# Patient Record
Sex: FEMALE | HISPANIC OR LATINO | Employment: FULL TIME | URBAN - METROPOLITAN AREA
[De-identification: names, ages, dates, MRNs, and addresses within clinical notes are randomized per-mention and may not be internally consistent; named-entity substitution may affect disease eponyms.]

---

## 2021-06-08 ENCOUNTER — HOSPITAL ENCOUNTER (OUTPATIENT)
Dept: LAB | Facility: MEDICAL CENTER | Age: 21
End: 2021-06-08
Attending: INTERNAL MEDICINE
Payer: COMMERCIAL

## 2021-06-08 LAB
FERRITIN SERPL-MCNC: 230 NG/ML (ref 10–291)
HBV CORE AB SERPL QL IA: NONREACTIVE
HBV SURFACE AB SERPL IA-ACNC: ABNORMAL MIU/ML (ref 0–10)
HBV SURFACE AG SER QL: NORMAL
HCV AB SER QL: NORMAL
IRON SATN MFR SERPL: 29 % (ref 15–55)
IRON SERPL-MCNC: 90 UG/DL (ref 40–170)
TIBC SERPL-MCNC: 314 UG/DL (ref 250–450)
UIBC SERPL-MCNC: 224 UG/DL (ref 110–370)

## 2021-06-08 PROCEDURE — 82390 ASSAY OF CERULOPLASMIN: CPT

## 2021-06-08 PROCEDURE — 82728 ASSAY OF FERRITIN: CPT

## 2021-06-08 PROCEDURE — 83540 ASSAY OF IRON: CPT

## 2021-06-08 PROCEDURE — 82104 ALPHA-1-ANTITRYPSIN PHENO: CPT

## 2021-06-08 PROCEDURE — 86704 HEP B CORE ANTIBODY TOTAL: CPT

## 2021-06-08 PROCEDURE — 36415 COLL VENOUS BLD VENIPUNCTURE: CPT

## 2021-06-08 PROCEDURE — 86706 HEP B SURFACE ANTIBODY: CPT

## 2021-06-08 PROCEDURE — 87340 HEPATITIS B SURFACE AG IA: CPT

## 2021-06-08 PROCEDURE — 83550 IRON BINDING TEST: CPT

## 2021-06-08 PROCEDURE — 86708 HEPATITIS A ANTIBODY: CPT

## 2021-06-08 PROCEDURE — 82103 ALPHA-1-ANTITRYPSIN TOTAL: CPT

## 2021-06-08 PROCEDURE — 86803 HEPATITIS C AB TEST: CPT

## 2021-06-10 LAB
CERULOPLASMIN SERPL-MCNC: 48 MG/DL (ref 17–54)
HAV AB SER QL IA: POSITIVE

## 2021-06-12 LAB
A1AT PHENOTYP SERPL-IMP: NORMAL
A1AT SERPL-MCNC: 193 MG/DL (ref 90–200)

## 2024-04-05 ENCOUNTER — HOSPITAL ENCOUNTER (EMERGENCY)
Facility: MEDICAL CENTER | Age: 24
End: 2024-04-05
Attending: OBSTETRICS & GYNECOLOGY | Admitting: OBSTETRICS & GYNECOLOGY
Payer: COMMERCIAL

## 2024-04-05 VITALS
TEMPERATURE: 97.6 F | RESPIRATION RATE: 18 BRPM | HEART RATE: 82 BPM | HEIGHT: 60 IN | SYSTOLIC BLOOD PRESSURE: 129 MMHG | BODY MASS INDEX: 34.16 KG/M2 | OXYGEN SATURATION: 99 % | WEIGHT: 174 LBS | DIASTOLIC BLOOD PRESSURE: 85 MMHG

## 2024-04-05 LAB
APPEARANCE UR: CLEAR
COLOR UR AUTO: YELLOW
CRYSTALS AMN MICRO: NORMAL
GLUCOSE UR QL STRIP.AUTO: NEGATIVE MG/DL
KETONES UR QL STRIP.AUTO: NEGATIVE MG/DL
LEUKOCYTE ESTERASE UR QL STRIP.AUTO: ABNORMAL
NITRITE UR QL STRIP.AUTO: NEGATIVE
PH UR STRIP.AUTO: 7 [PH] (ref 5–8)
PROT UR QL STRIP: ABNORMAL MG/DL
RBC UR QL AUTO: ABNORMAL
SP GR UR STRIP.AUTO: 1.02 (ref 1–1.03)

## 2024-04-05 PROCEDURE — 81002 URINALYSIS NONAUTO W/O SCOPE: CPT

## 2024-04-05 PROCEDURE — 89060 EXAM SYNOVIAL FLUID CRYSTALS: CPT

## 2024-04-05 PROCEDURE — 59025 FETAL NON-STRESS TEST: CPT

## 2024-04-05 PROCEDURE — 99284 EMERGENCY DEPT VISIT MOD MDM: CPT

## 2024-04-05 ASSESSMENT — PAIN SCALES - GENERAL: PAINLEVEL: 0 - NO PAIN

## 2024-04-05 NOTE — PROGRESS NOTES
1339- Discharge instructions reviewed with pt including when to return. Pt verbalizes understanding. Pt able to ambulate off of unit independently.

## 2024-04-05 NOTE — ED PROVIDER NOTES
OB ED Note    Date: 2024    Patient ID: 23-year-old  2 para 0-0-1-0 at 38+6 weeks by ultrasound (EDC 2024)    Chief complaint: Spontaneous rupture of membranes.    History of present illness: The patient has prenatal care with myself.  Her pregnancy has been relatively uncomplicated.  She reports that she woke up this morning and thought that she had watery discharge as well as brown and thought that she had watery discharge as well and is brown spotting on the toilet paper.  She denies bright red bleeding.  She is not feeling contractions.  She endorses positive fetal movement.  She was checked in our office on Tuesday and she was fingertip.  She has not had any additional loss of fluid.  She has no other concerns at this time.    Review of systems: Denies fevers, chills, shortness of breath/chest pain, nausea/vomiting, diarrhea or dysuria.  Past medical history:    Fatty liver disease, nonalcoholic.    Past surgical history: Jaw surgery.    Family history: Paternal aunt with hypertension and uncle with hyperlipidemia.    Social history: Denies tobacco use, alcohol use or drug use.  The patient's partner's name is Bryant.    GYN history: Last menstrual period 4/10/2023.  Denies history of sexually transmitted infections or genital herpes.    OB history: G1: Spontaneous miscarriage; G2: Current.    Allergies: No known drug allergies.    Medications: Prenatal vitamins.    Physical exam:  General: Alert, conversational, pleasant, no acute  distress  Cardiovascular: Regular rate  Pulmonary: Respiratory distress, symmetric expansion  Abdomen: Soft, nontender, nondistended, gravid  Genitourinary: Deferred  Extremities: Moves all, no edema    NST: Baseline 140s, moderate variability, positive colorations, no decelerations, tocometer with contractions every 3 minutes.    Laboratory studies: Ferning negative.    Assessment/plan:  23-year-old  2 para 0-0-1-0 at 38+6 weeks by ultrasound (EDC  4/13/2024)  1.  Term intrauterine pregnancy at 38+6 weeks.  2.  Rule out spontaneous rupture of membranes.    Discussion: The patient was seen and evaluated at bedside.  She presented due to possible spontaneous rupture of membranes however her ferning is negative.  She has not had any additional leaking.  Her cervix is fingertip.  She is pete every 3 minutes but she does not feel her contractions.  Her fetal heart tracing is category 1.  She has an appointment with me next Wednesday.    Plan:  1.  Okay to discharge home.  2.  Labor precautions.  3.  Fetal kick counts.    Lois Harkins MD

## 2024-04-05 NOTE — PROGRESS NOTES
1115 Pt arrived on unit for LOF since midnight. Pt is 38w6d.  with an uncomplicated pregnancy. Pt denies VB and Ucs and reports positive fetal movement. Notified Dr. Harkins. Orders received.     1129 Orders received from Gianni.    1320 Dr. Harkins at bedside. Discharge orders received.

## 2024-04-08 ENCOUNTER — HOSPITAL ENCOUNTER (EMERGENCY)
Facility: MEDICAL CENTER | Age: 24
End: 2024-04-08
Attending: OBSTETRICS & GYNECOLOGY | Admitting: OBSTETRICS & GYNECOLOGY
Payer: COMMERCIAL

## 2024-04-08 VITALS
WEIGHT: 174 LBS | TEMPERATURE: 98 F | RESPIRATION RATE: 18 BRPM | DIASTOLIC BLOOD PRESSURE: 76 MMHG | HEART RATE: 100 BPM | BODY MASS INDEX: 34.16 KG/M2 | HEIGHT: 60 IN | OXYGEN SATURATION: 98 % | SYSTOLIC BLOOD PRESSURE: 123 MMHG

## 2024-04-08 PROCEDURE — 99282 EMERGENCY DEPT VISIT SF MDM: CPT

## 2024-04-08 PROCEDURE — 59025 FETAL NON-STRESS TEST: CPT

## 2024-04-08 ASSESSMENT — ENCOUNTER SYMPTOMS
CARDIOVASCULAR NEGATIVE: 1
RESPIRATORY NEGATIVE: 1
EYES NEGATIVE: 1
CONSTITUTIONAL NEGATIVE: 1

## 2024-04-08 NOTE — PROGRESS NOTES
EDC   2024   EGA   39w2d    1440:TOCO/US applied, pt educated. Pt presents with c/o UC's that started last night and became more regular and painful throughout the day today. Pt states her cervix was checked on Friday last week and was closed. Pt declines LOF, VB, and states +FM. Pt declines any complications with this pregnancy. POC discussed, all questions and concerns addressed.     1445: SVE /-2    1630: SVE  /2    1647: Discharge orders received from Dr. Cardenas.     1655: Discharge instructions gone over with patient, all questions and concerns addressed.

## 2024-04-08 NOTE — ED PROVIDER NOTES
Emergency Obstetric Consultation     Date of Service  2024    Reason for Consultation  No chief complaint on file.      History of Presenting Illness  23 y.o. female who presented 2024 G1, P0 at 39 weeks and 2 days, here complaining of contractions.  She denies loss of fluid, she reports good fetal movement.  She denies any vaginal bleeding  She does say she is having contractions every 5 minutes but they have since decreased since she has come to labor and delivery    Review of Systems  Review of Systems   Constitutional: Negative.    HENT: Negative.     Eyes: Negative.    Respiratory: Negative.     Cardiovascular: Negative.    Skin: Negative.        Obstetric History    OB History    Para Term  AB Living   1             SAB IAB Ectopic Molar Multiple Live Births                    # Outcome Date GA Lbr Rao/2nd Weight Sex Delivery Anes PTL Lv   1 Current                Gynecologic History  Patient's last menstrual period was 2023.    Medical History  Nonalcoholic fatty liver    Surgical History  Jaw surgery+    Family History  family history is not on file.    Social History       Medications  Medications Prior to Admission   Medication Sig Dispense Refill Last Dose    Prenatal MV-Min-Fe Fum-FA-DHA (PRENATAL 1 PO) Take  by mouth.          Allergies  No Known Allergies    Physical Exam  Patient Vitals for the past 24 hrs:   BP Temp Temp src Pulse Resp SpO2 Height Weight   24 1445 123/76 36.7 °C (98 °F) Temporal 100 18 98 % 1.524 m (5') 78.9 kg (174 lb)      HENT:      Head: Normocephalic.   Abdominal:      General: Abdomen is flat.      Tenderness: There is no abdominal tenderness.   Pulmonary:      Effort: Pulmonary effort is normal.   Neurological:      Mental Status: She is alert.   Vitals and nursing note reviewed.     Cervical check per RN at 1500,  and /-2, recheck 1-1/2 hours later /-2.    NST read by me  Fetal heart tones 140s reactive with no decelerations,  category 1  [place] contractions every 5 minutes             Assessment & Plan  Assessment:  IUP 39 weeks 2 days with likely prodromal labor  Reassuring fetal status    Plan:  Follow-up with primary provider as scheduled return for increasing frequency or intensity of contractions          Sue Sun M.D.

## 2024-04-10 ENCOUNTER — HOSPITAL ENCOUNTER (INPATIENT)
Facility: MEDICAL CENTER | Age: 24
LOS: 3 days | End: 2024-04-13
Attending: OBSTETRICS & GYNECOLOGY | Admitting: OBSTETRICS & GYNECOLOGY
Payer: COMMERCIAL

## 2024-04-10 ENCOUNTER — ANESTHESIA (OUTPATIENT)
Dept: ANESTHESIOLOGY | Facility: MEDICAL CENTER | Age: 24
End: 2024-04-10
Payer: COMMERCIAL

## 2024-04-10 ENCOUNTER — ANESTHESIA EVENT (OUTPATIENT)
Dept: ANESTHESIOLOGY | Facility: MEDICAL CENTER | Age: 24
End: 2024-04-10
Payer: COMMERCIAL

## 2024-04-10 DIAGNOSIS — R52 POSTPARTUM PAIN: ICD-10-CM

## 2024-04-10 LAB
BASOPHILS # BLD AUTO: 0.1 % (ref 0–1.8)
BASOPHILS # BLD: 0.02 K/UL (ref 0–0.12)
EOSINOPHIL # BLD AUTO: 0.17 K/UL (ref 0–0.51)
EOSINOPHIL NFR BLD: 1.2 % (ref 0–6.9)
ERYTHROCYTE [DISTWIDTH] IN BLOOD BY AUTOMATED COUNT: 41.5 FL (ref 35.9–50)
HCT VFR BLD AUTO: 36.3 % (ref 37–47)
HGB BLD-MCNC: 12.3 G/DL (ref 12–16)
HOLDING TUBE BB 8507: NORMAL
IMM GRANULOCYTES # BLD AUTO: 0.08 K/UL (ref 0–0.11)
IMM GRANULOCYTES NFR BLD AUTO: 0.5 % (ref 0–0.9)
LYMPHOCYTES # BLD AUTO: 1.7 K/UL (ref 1–4.8)
LYMPHOCYTES NFR BLD: 11.7 % (ref 22–41)
MCH RBC QN AUTO: 29.4 PG (ref 27–33)
MCHC RBC AUTO-ENTMCNC: 33.9 G/DL (ref 32.2–35.5)
MCV RBC AUTO: 86.6 FL (ref 81.4–97.8)
MONOCYTES # BLD AUTO: 1.24 K/UL (ref 0–0.85)
MONOCYTES NFR BLD AUTO: 8.5 % (ref 0–13.4)
NEUTROPHILS # BLD AUTO: 11.35 K/UL (ref 1.82–7.42)
NEUTROPHILS NFR BLD: 78 % (ref 44–72)
NRBC # BLD AUTO: 0 K/UL
NRBC BLD-RTO: 0 /100 WBC (ref 0–0.2)
PLATELET # BLD AUTO: 297 K/UL (ref 164–446)
PMV BLD AUTO: 9 FL (ref 9–12.9)
RBC # BLD AUTO: 4.19 M/UL (ref 4.2–5.4)
T PALLIDUM AB SER QL IA: NORMAL
WBC # BLD AUTO: 14.6 K/UL (ref 4.8–10.8)

## 2024-04-10 PROCEDURE — 770002 HCHG ROOM/CARE - OB PRIVATE (112)

## 2024-04-10 PROCEDURE — 303615 HCHG EPIDURAL/SPINAL ANESTHESIA FOR LABOR

## 2024-04-10 PROCEDURE — 86780 TREPONEMA PALLIDUM: CPT

## 2024-04-10 PROCEDURE — 36415 COLL VENOUS BLD VENIPUNCTURE: CPT

## 2024-04-10 PROCEDURE — 700101 HCHG RX REV CODE 250: Performed by: ANESTHESIOLOGY

## 2024-04-10 PROCEDURE — 85025 COMPLETE CBC W/AUTO DIFF WBC: CPT

## 2024-04-10 PROCEDURE — 700105 HCHG RX REV CODE 258: Performed by: ANESTHESIOLOGY

## 2024-04-10 PROCEDURE — 700105 HCHG RX REV CODE 258: Performed by: OBSTETRICS & GYNECOLOGY

## 2024-04-10 PROCEDURE — 700111 HCHG RX REV CODE 636 W/ 250 OVERRIDE (IP): Mod: JZ

## 2024-04-10 RX ORDER — SODIUM CHLORIDE, SODIUM LACTATE, POTASSIUM CHLORIDE, CALCIUM CHLORIDE 600; 310; 30; 20 MG/100ML; MG/100ML; MG/100ML; MG/100ML
INJECTION, SOLUTION INTRAVENOUS CONTINUOUS
Status: DISCONTINUED | OUTPATIENT
Start: 2024-04-10 | End: 2024-04-11

## 2024-04-10 RX ORDER — ROPIVACAINE HYDROCHLORIDE 2 MG/ML
INJECTION, SOLUTION EPIDURAL; INFILTRATION; PERINEURAL CONTINUOUS
Status: DISCONTINUED | OUTPATIENT
Start: 2024-04-10 | End: 2024-04-11

## 2024-04-10 RX ORDER — ONDANSETRON 2 MG/ML
4 INJECTION INTRAMUSCULAR; INTRAVENOUS EVERY 6 HOURS PRN
Status: DISCONTINUED | OUTPATIENT
Start: 2024-04-10 | End: 2024-04-11 | Stop reason: HOSPADM

## 2024-04-10 RX ORDER — OXYTOCIN 10 [USP'U]/ML
10 INJECTION, SOLUTION INTRAMUSCULAR; INTRAVENOUS
Status: DISCONTINUED | OUTPATIENT
Start: 2024-04-10 | End: 2024-04-11 | Stop reason: HOSPADM

## 2024-04-10 RX ORDER — EPHEDRINE SULFATE 50 MG/ML
5 INJECTION, SOLUTION INTRAVENOUS
Status: DISCONTINUED | OUTPATIENT
Start: 2024-04-10 | End: 2024-04-11 | Stop reason: HOSPADM

## 2024-04-10 RX ORDER — IBUPROFEN 800 MG/1
800 TABLET ORAL
Status: DISCONTINUED | OUTPATIENT
Start: 2024-04-10 | End: 2024-04-11 | Stop reason: HOSPADM

## 2024-04-10 RX ORDER — SODIUM CHLORIDE, SODIUM LACTATE, POTASSIUM CHLORIDE, AND CALCIUM CHLORIDE .6; .31; .03; .02 G/100ML; G/100ML; G/100ML; G/100ML
250 INJECTION, SOLUTION INTRAVENOUS PRN
Status: DISCONTINUED | OUTPATIENT
Start: 2024-04-10 | End: 2024-04-11 | Stop reason: HOSPADM

## 2024-04-10 RX ORDER — LIDOCAINE HYDROCHLORIDE 20 MG/ML
INJECTION, SOLUTION EPIDURAL; INFILTRATION; INTRACAUDAL; PERINEURAL PRN
Status: DISCONTINUED | OUTPATIENT
Start: 2024-04-10 | End: 2024-04-11 | Stop reason: SURG

## 2024-04-10 RX ORDER — ACETAMINOPHEN 500 MG
1000 TABLET ORAL
Status: DISCONTINUED | OUTPATIENT
Start: 2024-04-10 | End: 2024-04-11 | Stop reason: HOSPADM

## 2024-04-10 RX ORDER — LIDOCAINE HYDROCHLORIDE 10 MG/ML
20 INJECTION, SOLUTION INFILTRATION; PERINEURAL
Status: DISCONTINUED | OUTPATIENT
Start: 2024-04-10 | End: 2024-04-11 | Stop reason: HOSPADM

## 2024-04-10 RX ORDER — ROPIVACAINE HYDROCHLORIDE 2 MG/ML
INJECTION, SOLUTION EPIDURAL; INFILTRATION; PERINEURAL
Status: DISCONTINUED
Start: 2024-04-10 | End: 2024-04-10

## 2024-04-10 RX ORDER — SODIUM CHLORIDE, SODIUM LACTATE, POTASSIUM CHLORIDE, AND CALCIUM CHLORIDE .6; .31; .03; .02 G/100ML; G/100ML; G/100ML; G/100ML
1000 INJECTION, SOLUTION INTRAVENOUS
Status: COMPLETED | OUTPATIENT
Start: 2024-04-10 | End: 2024-04-11

## 2024-04-10 RX ORDER — TERBUTALINE SULFATE 1 MG/ML
0.25 INJECTION, SOLUTION SUBCUTANEOUS
Status: DISCONTINUED | OUTPATIENT
Start: 2024-04-10 | End: 2024-04-11 | Stop reason: HOSPADM

## 2024-04-10 RX ORDER — ONDANSETRON 4 MG/1
4 TABLET, ORALLY DISINTEGRATING ORAL EVERY 6 HOURS PRN
Status: DISCONTINUED | OUTPATIENT
Start: 2024-04-10 | End: 2024-04-11 | Stop reason: HOSPADM

## 2024-04-10 RX ADMIN — ROPIVACAINE HYDROCHLORIDE 200 MG: 2 INJECTION, SOLUTION EPIDURAL; INFILTRATION at 23:09

## 2024-04-10 RX ADMIN — SODIUM CHLORIDE, POTASSIUM CHLORIDE, SODIUM LACTATE AND CALCIUM CHLORIDE 1000 ML: 600; 310; 30; 20 INJECTION, SOLUTION INTRAVENOUS at 22:00

## 2024-04-10 RX ADMIN — LIDOCAINE HYDROCHLORIDE 60 MG: 20 INJECTION, SOLUTION EPIDURAL; INFILTRATION; INTRACAUDAL at 22:55

## 2024-04-10 RX ADMIN — SODIUM CHLORIDE, POTASSIUM CHLORIDE, SODIUM LACTATE AND CALCIUM CHLORIDE: 600; 310; 30; 20 INJECTION, SOLUTION INTRAVENOUS at 22:38

## 2024-04-10 ASSESSMENT — LIFESTYLE VARIABLES
EVER_SMOKED: NEVER
EVER FELT BAD OR GUILTY ABOUT YOUR DRINKING: NO
DOES PATIENT WANT TO STOP DRINKING: NO
TOTAL SCORE: 0
EVER HAD A DRINK FIRST THING IN THE MORNING TO STEADY YOUR NERVES TO GET RID OF A HANGOVER: NO
HAVE PEOPLE ANNOYED YOU BY CRITICIZING YOUR DRINKING: NO
ON A TYPICAL DAY WHEN YOU DRINK ALCOHOL HOW MANY DRINKS DO YOU HAVE: 0
ALCOHOL_USE: NO
AVERAGE NUMBER OF DAYS PER WEEK YOU HAVE A DRINK CONTAINING ALCOHOL: 0
TOTAL SCORE: 0
HOW MANY TIMES IN THE PAST YEAR HAVE YOU HAD 5 OR MORE DRINKS IN A DAY: 0
TOTAL SCORE: 0
CONSUMPTION TOTAL: NEGATIVE
HAVE YOU EVER FELT YOU SHOULD CUT DOWN ON YOUR DRINKING: NO

## 2024-04-10 ASSESSMENT — PATIENT HEALTH QUESTIONNAIRE - PHQ9
SUM OF ALL RESPONSES TO PHQ9 QUESTIONS 1 AND 2: 0
2. FEELING DOWN, DEPRESSED, IRRITABLE, OR HOPELESS: NOT AT ALL
1. LITTLE INTEREST OR PLEASURE IN DOING THINGS: NOT AT ALL

## 2024-04-10 ASSESSMENT — PAIN SCALES - GENERAL: PAINLEVEL: 9

## 2024-04-10 ASSESSMENT — PAIN DESCRIPTION - PAIN TYPE
TYPE: ACUTE PAIN
TYPE: ACUTE PAIN

## 2024-04-11 LAB
FLUAV RNA SPEC QL NAA+PROBE: NEGATIVE
FLUBV RNA SPEC QL NAA+PROBE: NEGATIVE
RSV RNA SPEC QL NAA+PROBE: NEGATIVE
SARS-COV-2 RNA RESP QL NAA+PROBE: DETECTED
SPECIMEN SOURCE: ABNORMAL

## 2024-04-11 PROCEDURE — 700111 HCHG RX REV CODE 636 W/ 250 OVERRIDE (IP): Mod: JZ | Performed by: ANESTHESIOLOGY

## 2024-04-11 PROCEDURE — 303615 HCHG EPIDURAL/SPINAL ANESTHESIA FOR LABOR

## 2024-04-11 PROCEDURE — 770002 HCHG ROOM/CARE - OB PRIVATE (112)

## 2024-04-11 PROCEDURE — 700111 HCHG RX REV CODE 636 W/ 250 OVERRIDE (IP): Performed by: OBSTETRICS & GYNECOLOGY

## 2024-04-11 PROCEDURE — 304965 HCHG RECOVERY SERVICES

## 2024-04-11 PROCEDURE — 59409 OBSTETRICAL CARE: CPT

## 2024-04-11 PROCEDURE — 700102 HCHG RX REV CODE 250 W/ 637 OVERRIDE(OP): Performed by: OBSTETRICS & GYNECOLOGY

## 2024-04-11 PROCEDURE — 700105 HCHG RX REV CODE 258: Performed by: OBSTETRICS & GYNECOLOGY

## 2024-04-11 PROCEDURE — 0241U HCHG SARS-COV-2 COVID-19 NFCT DS RESP RNA 4 TRGT MIC: CPT

## 2024-04-11 PROCEDURE — A9270 NON-COVERED ITEM OR SERVICE: HCPCS | Performed by: OBSTETRICS & GYNECOLOGY

## 2024-04-11 PROCEDURE — 0KQM0ZZ REPAIR PERINEUM MUSCLE, OPEN APPROACH: ICD-10-PCS | Performed by: OBSTETRICS & GYNECOLOGY

## 2024-04-11 RX ORDER — DEXTROSE, SODIUM CHLORIDE, SODIUM LACTATE, POTASSIUM CHLORIDE, AND CALCIUM CHLORIDE 5; .6; .31; .03; .02 G/100ML; G/100ML; G/100ML; G/100ML; G/100ML
INJECTION, SOLUTION INTRAVENOUS CONTINUOUS
Status: DISCONTINUED | OUTPATIENT
Start: 2024-04-11 | End: 2024-04-11

## 2024-04-11 RX ORDER — MISOPROSTOL 200 UG/1
600 TABLET ORAL
Status: DISCONTINUED | OUTPATIENT
Start: 2024-04-11 | End: 2024-04-13 | Stop reason: HOSPADM

## 2024-04-11 RX ORDER — IBUPROFEN 800 MG/1
800 TABLET ORAL EVERY 8 HOURS PRN
Qty: 21 TABLET | Refills: 0 | Status: CANCELLED | OUTPATIENT
Start: 2024-04-11 | End: 2024-04-18

## 2024-04-11 RX ORDER — IBUPROFEN 800 MG/1
800 TABLET ORAL EVERY 8 HOURS PRN
Status: DISCONTINUED | OUTPATIENT
Start: 2024-04-11 | End: 2024-04-13 | Stop reason: HOSPADM

## 2024-04-11 RX ORDER — ACETAMINOPHEN 160 MG/5ML
1000 SUSPENSION ORAL
Status: COMPLETED | OUTPATIENT
Start: 2024-04-11 | End: 2024-04-11

## 2024-04-11 RX ORDER — VITAMIN A ACETATE, BETA CAROTENE, ASCORBIC ACID, CHOLECALCIFEROL, .ALPHA.-TOCOPHEROL ACETATE, DL-, THIAMINE MONONITRATE, RIBOFLAVIN, NIACINAMIDE, PYRIDOXINE HYDROCHLORIDE, FOLIC ACID, CYANOCOBALAMIN, CALCIUM CARBONATE, FERROUS FUMARATE, ZINC OXIDE, CUPRIC OXIDE 3080; 12; 120; 400; 1; 1.84; 3; 20; 22; 920; 25; 200; 27; 10; 2 [IU]/1; UG/1; MG/1; [IU]/1; MG/1; MG/1; MG/1; MG/1; MG/1; [IU]/1; MG/1; MG/1; MG/1; MG/1; MG/1
1 TABLET, FILM COATED ORAL
Status: DISCONTINUED | OUTPATIENT
Start: 2024-04-11 | End: 2024-04-13 | Stop reason: HOSPADM

## 2024-04-11 RX ORDER — DIPHENHYDRAMINE HYDROCHLORIDE 50 MG/ML
50 INJECTION INTRAMUSCULAR; INTRAVENOUS ONCE
Status: COMPLETED | OUTPATIENT
Start: 2024-04-11 | End: 2024-04-11

## 2024-04-11 RX ORDER — GUAIFENESIN 600 MG/1
600 TABLET, EXTENDED RELEASE ORAL EVERY 12 HOURS
Status: DISCONTINUED | OUTPATIENT
Start: 2024-04-11 | End: 2024-04-13 | Stop reason: HOSPADM

## 2024-04-11 RX ORDER — ACETAMINOPHEN 160 MG/5ML
1000 SUSPENSION ORAL ONCE
Status: COMPLETED | OUTPATIENT
Start: 2024-04-11 | End: 2024-04-11

## 2024-04-11 RX ORDER — SODIUM CHLORIDE, SODIUM LACTATE, POTASSIUM CHLORIDE, CALCIUM CHLORIDE 600; 310; 30; 20 MG/100ML; MG/100ML; MG/100ML; MG/100ML
INJECTION, SOLUTION INTRAVENOUS PRN
Status: DISCONTINUED | OUTPATIENT
Start: 2024-04-11 | End: 2024-04-13 | Stop reason: HOSPADM

## 2024-04-11 RX ORDER — ACETAMINOPHEN 500 MG
1000 TABLET ORAL EVERY 6 HOURS PRN
Status: DISCONTINUED | OUTPATIENT
Start: 2024-04-11 | End: 2024-04-13 | Stop reason: HOSPADM

## 2024-04-11 RX ORDER — DOCUSATE SODIUM 100 MG/1
100 CAPSULE, LIQUID FILLED ORAL 2 TIMES DAILY PRN
Status: DISCONTINUED | OUTPATIENT
Start: 2024-04-11 | End: 2024-04-13 | Stop reason: HOSPADM

## 2024-04-11 RX ADMIN — SODIUM CHLORIDE, POTASSIUM CHLORIDE, SODIUM LACTATE AND CALCIUM CHLORIDE: 600; 310; 30; 20 INJECTION, SOLUTION INTRAVENOUS at 03:52

## 2024-04-11 RX ADMIN — OXYTOCIN 125 ML/HR: 10 INJECTION, SOLUTION INTRAMUSCULAR; INTRAVENOUS at 14:44

## 2024-04-11 RX ADMIN — SODIUM CHLORIDE, SODIUM LACTATE, POTASSIUM CHLORIDE, CALCIUM CHLORIDE AND DEXTROSE MONOHYDRATE: 5; 600; 310; 30; 20 INJECTION, SOLUTION INTRAVENOUS at 11:06

## 2024-04-11 RX ADMIN — ACETAMINOPHEN 960 MG: 160 SUSPENSION ORAL at 15:01

## 2024-04-11 RX ADMIN — SODIUM CHLORIDE, SODIUM LACTATE, POTASSIUM CHLORIDE, CALCIUM CHLORIDE AND DEXTROSE MONOHYDRATE: 5; 600; 310; 30; 20 INJECTION, SOLUTION INTRAVENOUS at 12:46

## 2024-04-11 RX ADMIN — ACETAMINOPHEN 960 MG: 160 SUSPENSION ORAL at 10:47

## 2024-04-11 RX ADMIN — SODIUM CHLORIDE, POTASSIUM CHLORIDE, SODIUM LACTATE AND CALCIUM CHLORIDE: 600; 310; 30; 20 INJECTION, SOLUTION INTRAVENOUS at 09:49

## 2024-04-11 RX ADMIN — ROPIVACAINE HYDROCHLORIDE: 2 INJECTION, SOLUTION EPIDURAL; INFILTRATION at 06:14

## 2024-04-11 RX ADMIN — DIPHENHYDRAMINE HYDROCHLORIDE 50 MG: 50 INJECTION, SOLUTION INTRAMUSCULAR; INTRAVENOUS at 09:11

## 2024-04-11 RX ADMIN — OXYTOCIN 125 ML/HR: 10 INJECTION, SOLUTION INTRAMUSCULAR; INTRAVENOUS at 16:12

## 2024-04-11 RX ADMIN — OXYTOCIN 2 MILLI-UNITS/MIN: 10 INJECTION, SOLUTION INTRAMUSCULAR; INTRAVENOUS at 12:54

## 2024-04-11 RX ADMIN — OXYTOCIN 20 UNITS: 10 INJECTION, SOLUTION INTRAMUSCULAR; INTRAVENOUS at 14:06

## 2024-04-11 RX ADMIN — GUAIFENESIN 600 MG: 600 TABLET, EXTENDED RELEASE ORAL at 20:04

## 2024-04-11 ASSESSMENT — PATIENT HEALTH QUESTIONNAIRE - PHQ9
2. FEELING DOWN, DEPRESSED, IRRITABLE, OR HOPELESS: NOT AT ALL
1. LITTLE INTEREST OR PLEASURE IN DOING THINGS: NOT AT ALL
SUM OF ALL RESPONSES TO PHQ9 QUESTIONS 1 AND 2: 0

## 2024-04-11 ASSESSMENT — EDINBURGH POSTNATAL DEPRESSION SCALE (EPDS)
I HAVE BEEN ANXIOUS OR WORRIED FOR NO GOOD REASON: HARDLY EVER
I HAVE BEEN ABLE TO LAUGH AND SEE THE FUNNY SIDE OF THINGS: AS MUCH AS I ALWAYS COULD
I HAVE BEEN SO UNHAPPY THAT I HAVE HAD DIFFICULTY SLEEPING: NOT AT ALL
I HAVE FELT SAD OR MISERABLE: NOT VERY OFTEN
I HAVE LOOKED FORWARD WITH ENJOYMENT TO THINGS: AS MUCH AS I EVER DID
I HAVE BEEN SO UNHAPPY THAT I HAVE BEEN CRYING: NO, NEVER
I HAVE BLAMED MYSELF UNNECESSARILY WHEN THINGS WENT WRONG: NO, NEVER
THINGS HAVE BEEN GETTING ON TOP OF ME: NO, I HAVE BEEN COPING AS WELL AS EVER
I HAVE FELT SCARED OR PANICKY FOR NO GOOD REASON: NO, NOT AT ALL
THE THOUGHT OF HARMING MYSELF HAS OCCURRED TO ME: NEVER

## 2024-04-11 ASSESSMENT — PAIN DESCRIPTION - PAIN TYPE
TYPE: ACUTE PAIN

## 2024-04-11 ASSESSMENT — PAIN SCALES - GENERAL: PAIN_LEVEL: 0

## 2024-04-11 NOTE — PROGRESS NOTES
Pt of Torin MCKEON presents with complaints of contractions.   Tsai Gestation 39-4 weeks. EDC: 4/13  G 1 P 0    Pt reports feeling painful contractions since last night. Rates them 8-9/10.  Reports +FM, -VB, - LOF. Denies problems with pregnancy. Pt reports seeing Torin MCKEON in office today. Reports she was 4cm and received a membrane sweep.  2041- SVE performed. Pt 4/90/0.   2047- River MCKEON notified of pt arrival and current status. Orders received to admit patient to L&D.  2057- Report given to Cass SMALL. Care relinquished at this time.

## 2024-04-11 NOTE — PROGRESS NOTES
1630- patient oriented to room and call light.  Patient assessment done.  Condition will continue to be monitored.

## 2024-04-11 NOTE — CARE PLAN
The patient is Stable - Low risk of patient condition declining or worsening    Shift Goals  Clinical Goals: Patient safety  Patient Goals: Healthy mom, healthy baby  Family Goals: Support    Progress made toward(s) clinical / shift goals:    Problem: Knowledge Deficit - L&D  Goal: Patient and family/caregivers will demonstrate understanding of plan of care, disease process/condition, diagnostic tests and medications  Description: Target End Date:  1-3 days or as soon as patient condition allows    1.  Oriented to unit, equipment, visitation policy and means for communicating concern  2.  Complete/review Learning Assessment  3.  Assess patient's preparation, knowledge level and expectations  4.  Provide accurate information in basic terms, clarify misconceptions  5.  Explain disease process/condition, treatment plan, diagnostic tests and medications  6.  Encourage patient and support person to ask questions and verbalize their understanding  7.  Instruct patient/support person in basic interpretation of fetal monitor  8.  Obtain informed consent when appropriate  9.  Demonstrate breathing/relaxation techniques  Outcome: Progressing  Note: Patient encouraged to ask RN questions about any part of POC that the patient may require further understanding.     Problem: Pain  Goal: Patient's pain will be alleviated or reduced to the patient’s comfort goal  Description: Target End Date:  Prior to discharge or change in level of care    1.  Document pain using the appropriate pain scale per order or unit policy  2.  Administer pain medications per provider order and/or assist with epidural/spinal placement as needed  3.  Pain management medications as ordered  4.  Educate and implement non-pharmacologic comfort measures (i.e. relaxation, distraction, massage, cold/heat therapy, etc.)  5.  Assess for nonverbal signs of ineffective coping with pain and offer pain medication and/or epidural anesthesia  Outcome:  Progressing  Note: Patient resting comfortably with an epidural in place     Problem: Risk for Injury  Goal: Patient and fetus will be free of preventable injury/complications  Description: Target End Date:  Prior to discharge or change in level of care    1.  Monitor uterine activity and if applicable progression of labor  2.  Monitor fetal well being  3.  Assure resuscitative equipment is available and within reach  Outcome: Progressing  Note: EFM and TOCO applied per orders        Patient is not progressing towards the following goals:

## 2024-04-11 NOTE — CARE PLAN
Problem: Knowledge Deficit - L&D  Goal: Patient and family/caregivers will demonstrate understanding of plan of care, disease process/condition, diagnostic tests and medications  Outcome: Progressing  Note: Discussed POC and expected labor progression with pt and S. O. Questions answered. Oriented to room and department     Problem: Psychosocial - L&D  Goal: Patient's level of anxiety will decrease  Outcome: Progressing  Note: Pt encouraged to voice any concerns  and ask questions as they arise     Problem: Pain  Goal: Patient's pain will be alleviated or reduced to the patient’s comfort goal  Outcome: Progressing  Note: Pt requesting epidural for labor pain. Questions answered. IV bolus infusing.   The patient is Stable - Low risk of patient condition declining or worsening    Shift Goals  Clinical Goals: healthy mom, healthy baby  Patient Goals: epidural, safe delivery  Family Goals: support    Progress made toward(s) clinical / shift goals:  IV bolus infusing for epidural placement. POC discussed and all questions answered.

## 2024-04-11 NOTE — ANESTHESIA TIME REPORT
Anesthesia Start and Stop Event Times       Date Time Event    4/10/2024 2245 Anesthesia Start     2317 Ready for Procedure    4/11/2024 1333 Anesthesia Stop          Responsible Staff  04/10/24 to 04/11/24      Name Role Begin End    Brayden Covington M.D. Anesth [id number]    Patel Thakur M.D. Anesth 0317 [id number]          Overtime Reason:  no overtime (within assigned shift)    Comments:

## 2024-04-11 NOTE — PROGRESS NOTES
2140 - Report received from GEOVANNY Odell. Pt requesting epidural. Iv bolus started.  2216 - MD Nadya notified of need for epidural. Will come after c/s  2249 MD Nadya present. Pt positioned for epidural  2255 - Test dose given. Pt tolerated procedure well. VSS.  2315 - Dr Cardenas at bedside and POC discussed  0330 - PC to Dr Ronald MD notified of SVE, SROM for mec fluid and fhr reassuring. Transition nurse and RT notified of mec fluid  0605 Dr Cardenas at bedside and SVE 7/100/0  0655 - Report given to GEOVANNY Centeno

## 2024-04-11 NOTE — PROGRESS NOTES
0655 Report rc'd from GEOVANNY Murray. Plan of care discussed and questions addressed. Pt. Resting comfortably with an epidural in place at this time. FOB and patient's mother at the bedside and supportive. Call light within reach. Care assumed at this time.      0835 SVE by RN. 9./+1    1225 RN notified by Debbie from Microbiology of patient's positive COVID test. Dr. Harkins notified by RN of results    1245 Dr. Harkins at bedside. SVE by provider. 10/100/+1. Orders to begin pushing.     1306 Dr. Harkins at bedside to push with patient.     1333 Delivery of viable female. 8/8 APGAR     1600 Pt up to bathroom. Voided adequately. Pericare done, gown changed. Pt educated on lochia and bleeding expectation. Pt in wheelchair and transported to postpartum in apparent stable condition with infant and all belongings. Report to GEOVANNY Saab. Bands and cuddles verified. Transfer of care at this time.

## 2024-04-11 NOTE — H&P
DATE OF ADMISSION:  04/10/2024     IDENTIFICATION:  This is a 23-year-old  at 39 weeks and 4 days.  She is a   patient of Dr. Harkins's and she is being admitted in spontaneous labor.     HISTORY OF PRESENT ILLNESS:  The patient's pregnancy is dated by a 7-week   ultrasound.  She has been followed by Dr. Harkins.  Pregnancy has been overall   uncomplicated.  She had a low-risk NIPT with negative cystic fibrosis and SMA   testing.  A 1-hour glucose test was normal.  Anatomy ultrasound was normal.    She has been taking vitamin D for vitamin D insufficiency.     PAST OBSTETRICAL HISTORY:  One previous miscarriage.     GYNECOLOGIC HISTORY:  She has irregular periods.  Her last Pap smear was   normal.     PAST MEDICAL HISTORY:  Nonalcoholic fatty liver disease.     PAST SURGICAL HISTORY:  Jaw surgery as a child.     SOCIAL HISTORY:  She has been working full time in a warehouse.  Her partner's   name is Jorgito.  She denies use of tobacco, alcohol or drugs.     ALLERGIES:  She has no known drug allergies.     PHYSICAL EXAMINATION:  VITAL SIGNS:  She is afebrile.  Her blood pressures have been normotensive.  GENERAL:  She is pleasant, cooperative and appears her stated age.  ABDOMEN:  Soft, gravid, Leopold's 7-1/2 pounds.  PELVIC:  Sterile vaginal exam, she was 4, 90, -2 on admission.  Fetal heart   tracing, baseline 130s with moderate variability.  Accels are present.  There   are no decelerations.  Tocometry reveals contractions every 2-3 minutes.     ASSESSMENT:  1.  A 23-year-old  at 39 weeks and 4 days.  2.  Labor.  3.  GBS negative.  4.  Maternal history of fatty liver - nonalcoholic.     PLAN:  Will be to admit for spontaneous labor.  She is planning on an epidural   for pain control.  We will follow her in labor and hope for vaginal delivery.        ______________________________  MD RUBEN Loco/COBY    DD:  04/10/2024 23:17  DT:  04/10/2024 23:51    Job#:  [id number]

## 2024-04-11 NOTE — CARE PLAN
The patient is Stable - Low risk of patient condition declining or worsening    Shift Goals  Clinical Goals: remain clinically stable  Patient Goals: Healthy mom, healthy baby  Family Goals: Support    Progress made toward(s) clinical / shift goals:  Patient will ask for pain medication when needed.  Patient has scant to light lochia with a firm palpable uterus.  Assessment will continue.     Patient is not progressing towards the following goals:

## 2024-04-11 NOTE — ANESTHESIA POSTPROCEDURE EVALUATION
Patient: Kelle Anaya    Procedure Summary       Date: 04/10/24 Room / Location:     Anesthesia Start: 2245 Anesthesia Stop: 04/11/24 1333    Procedure: Labor Epidural Diagnosis:     Scheduled Providers:  Responsible Provider: Patel Thakur M.D.    Anesthesia Type: epidural ASA Status: 2            Final Anesthesia Type: epidural  Last vitals  BP   Blood Pressure: 102/56    Temp   (!) 38.3 °C (101 °F)    Pulse   (!) 104   Resp   16    SpO2   96 %      Anesthesia Post Evaluation    Patient location during evaluation: PACU  Patient participation: complete - patient participated  Level of consciousness: awake and alert  Pain score: 0    Airway patency: patent  Anesthetic complications: no  Cardiovascular status: hemodynamically stable  Respiratory status: acceptable  Hydration status: euvolemic    PONV: none          No notable events documented.

## 2024-04-11 NOTE — L&D DELIVERY NOTE
Date: 2024    PREOPERATIVE DIAGNOSIS:   1.  Term intrauterine pregnancy at 39+4 weeks.  2.  Active labor.  3.  COVID-positive.    POSTOPERATIVE DIAGNOSIS:   1.  Term intrauterine pregnancy at 39+4 weeks.  2.  Active labor.  3.  COVID-positive.    PROCEDURE: Normal spontaneous vaginal delivery.    Primary OB/GYN: Lois Harkins MD    Delivering Physician: Lois Harkins MD    Anesthesia: Epidural.    Complications: None.    Estimated blood loss: 300 ml.    PROCEDURE DETAILS:   23 y.o.  presented to labor and delivery with a chief complaint of contractions she was 4 cm dilated and admitted in active labor.  She received an epidural for pain.  She had spontaneous rupture of membranes with thin meconium noted.  During her labor course she developed a fever of 101.1.  The fetal heart tracing was tachycardic into the 160s to 180s.  She reported that she has a sore throat.  She ended up testing positive for COVID.  She received Benadryl 50 mg IV due to a swollen anterior lip.  The edema reduced.  She was a rim and 0 station.  The patient was made aware of my concern in regards to the fetal heart tracing.  The patient did receive 1 g of oral Tylenol due to her temperature.  About an hour later she had another temperature of 100.7.  The fetal heart tracing demonstrated periods of minimal variability with fetal tachycardia.  There were no decelerations.  We discussed the possible need for  section however she wished to proceed with pushing.  I was in the room the entire time that the patient was pushing.  She pushed for approximately 30 minutes.  You fetal heart tracing showed improved variability with no decelerations.  The patient pushed the fetal head down to +2 station and I did offer a vacuum-assisted vaginal delivery however she declined.  After 2 more contractions she ended up delivering vaginally under epidural anesthesia. The infant was suctioned with the bulb. There was no nuchal cord. The infant  was placed on the patient's abdomen after delivery. The cord was clamped after a 30 second delay and subsequently cut by the father of the baby. The fundus was firm with massage and IV pitocin. The placenta delivered spontaneously, intact, with normal 3-vessel cord, in garsia presentation.  A bimanual exam was performed and the uterine cavity was cleared of all clot and debris.    There were no cervical lacerations.  There were no vaginal lacerations.  There was a second degree perineal lacerations which was repaired with 3-0 Vicryl on a CT1. Viable female infant weighs 3,715g (8lbs, 3oz.) Apgars were 8 and 8 at 1 and 5 minutes of life.    Both mother and infant are recovering and doing well at this time. Sponge and needle counts were correct x 1.       Lois Harkins M.D.

## 2024-04-11 NOTE — ANESTHESIA PREPROCEDURE EVALUATION
Date: 04/10/24  Procedure: Labor Epidural         Relevant Problems   No relevant active problems       Physical Exam    Airway   Mallampati: II  TM distance: >3 FB  Neck ROM: full       Cardiovascular   Rhythm: regular  Rate: normal     Dental - normal exam           Pulmonary    Abdominal   (+) obese     Neurological - normal exam                   Anesthesia Plan    ASA 2       Plan - epidural   Neuraxial block will be labor analgesia                  Pertinent diagnostic labs and testing reviewed    Informed Consent:    Anesthetic plan and risks discussed with patient.

## 2024-04-11 NOTE — PROGRESS NOTES
2057- Report received from GEOVANNY Kuhn. Transferred from [facility] to 14.     2110 EFM in place x2. IV started, labs sent. Admission profile completed.     2140 Report given to GEOVANNY Murray. POC discussed, questions answered.

## 2024-04-11 NOTE — PROGRESS NOTES
OBPN    Ruptured on her own about 330am, comfortable with her epidural  AFVSS  SVE 7/100/0  EFM-baseline 140's with mod variability, accels present with scalp stim, no decels  Making progress  Will follow.

## 2024-04-11 NOTE — ANESTHESIA PROCEDURE NOTES
Epidural Block    Date/Time: 4/10/2024 10:55 PM    Performed by: Brayden Covington M.D.  Authorized by: Brayden Covington M.D.    Patient Location:  OB  Start Time:  4/10/2024 10:55 PM  Reason for Block: labor analgesia    patient identified, IV checked, site marked, risks and benefits discussed, surgical consent, monitors and equipment checked, pre-op evaluation and timeout performed    Patient Position:  Sitting  Prep: ChloraPrep, patient draped and sterile technique    Monitoring:  Blood pressure, continuous pulse oximetry and heart rate  Approach:  Midline  Location:  L3-L4  Injection Technique:  ELLIOT saline  Skin infiltration:  Lidocaine  Strength:  2%  Dose:  3ml  Needle Type:  Tuohy  Needle Gauge:  17 G  Needle Length:  3.5 in  Loss of resistance::  5  Catheter Size:  19 G  Catheter at Skin Depth:  10  Test Dose Result:  Negative   The epidural was placed atruamatically.  No heme, no loss of fluid or dural puncture, no paresthesias, test dose was negative.  The catheter was secured with tape in a sterile fashion and hemodynamics were monitored closely.

## 2024-04-12 LAB
ERYTHROCYTE [DISTWIDTH] IN BLOOD BY AUTOMATED COUNT: 43.8 FL (ref 35.9–50)
HCT VFR BLD AUTO: 30.9 % (ref 37–47)
HGB BLD-MCNC: 10.3 G/DL (ref 12–16)
MCH RBC QN AUTO: 29.3 PG (ref 27–33)
MCHC RBC AUTO-ENTMCNC: 33.3 G/DL (ref 32.2–35.5)
MCV RBC AUTO: 88 FL (ref 81.4–97.8)
PLATELET # BLD AUTO: 245 K/UL (ref 164–446)
PMV BLD AUTO: 9.2 FL (ref 9–12.9)
RBC # BLD AUTO: 3.51 M/UL (ref 4.2–5.4)
WBC # BLD AUTO: 13 K/UL (ref 4.8–10.8)

## 2024-04-12 PROCEDURE — A9270 NON-COVERED ITEM OR SERVICE: HCPCS | Performed by: OBSTETRICS & GYNECOLOGY

## 2024-04-12 PROCEDURE — 700102 HCHG RX REV CODE 250 W/ 637 OVERRIDE(OP): Performed by: OBSTETRICS & GYNECOLOGY

## 2024-04-12 PROCEDURE — 36415 COLL VENOUS BLD VENIPUNCTURE: CPT

## 2024-04-12 PROCEDURE — 770002 HCHG ROOM/CARE - OB PRIVATE (112)

## 2024-04-12 PROCEDURE — 85027 COMPLETE CBC AUTOMATED: CPT

## 2024-04-12 RX ADMIN — Medication 1 LOZENGE: at 20:34

## 2024-04-12 RX ADMIN — IBUPROFEN 800 MG: 800 TABLET, FILM COATED ORAL at 01:42

## 2024-04-12 RX ADMIN — ACETAMINOPHEN 1000 MG: 500 TABLET, FILM COATED ORAL at 01:42

## 2024-04-12 RX ADMIN — GUAIFENESIN 600 MG: 600 TABLET, EXTENDED RELEASE ORAL at 18:30

## 2024-04-12 RX ADMIN — PRENATAL WITH FERROUS FUM AND FOLIC ACID 1 TABLET: 3080; 920; 120; 400; 22; 1.84; 3; 20; 10; 1; 12; 200; 27; 25; 2 TABLET ORAL at 07:44

## 2024-04-12 RX ADMIN — GUAIFENESIN 600 MG: 600 TABLET, EXTENDED RELEASE ORAL at 06:29

## 2024-04-12 ASSESSMENT — PAIN DESCRIPTION - PAIN TYPE: TYPE: ACUTE PAIN

## 2024-04-12 NOTE — CARE PLAN
The patient is Stable - Low risk of patient condition declining or worsening    Shift Goals  Clinical Goals: VSS, BF, pain management  Patient Goals: rooming in, bonding  Family Goals: support, bonding    Progress made toward(s) clinical / shift goals:    Problem: Knowledge Deficit - L&D  Goal: Patient and family/caregivers will demonstrate understanding of plan of care, disease process/condition, diagnostic tests and medications  Outcome: Progressing     Problem: Risk for Excess Fluid Volume  Goal: Patient will demonstrate pulse, blood pressure and neurologic signs within expected ranges and without any respiratory complications  Outcome: Progressing     Problem: Psychosocial - L&D  Goal: Patient's level of anxiety will decrease  Outcome: Progressing  Goal: Patient will be able to discuss coping skills during hospitalization  Outcome: Progressing  Goal: Patient's ability to re-evaluate and adapt role responsibilities will improve  Outcome: Progressing  Goal: Spiritual and cultural needs incorporated into hospitalization  Outcome: Progressing     Problem: Pain  Goal: Patient's pain will be alleviated or reduced to the patient’s comfort goal  Outcome: Progressing     Problem: Risk for Venous Thromboembolism (VTE)  Goal: VTE prevention measures will be implemented and patient will remain free from VTE  Outcome: Progressing     Problem: Risk for Infection and Impaired Wound Healing  Goal: Patient will remain free from infection  Outcome: Progressing  Goal: Patient's wound/surgical incision will decrease in size and heals properly  Outcome: Progressing     Problem: Risk for Fluid Imbalance  Goal: Patient's fluid volume balance will be maintained or improve  Outcome: Progressing     Problem: Risk for Injury  Goal: Patient and fetus will be free of preventable injury/complications  Outcome: Progressing     Problem: Discharge Barriers/Planning  Goal: Patient's continuum of care needs are met  Outcome: Progressing      Problem: Pain - Standard  Goal: Alleviation of pain or a reduction in pain to the patient’s comfort goal  Outcome: Progressing     Problem: Knowledge Deficit - Standard  Goal: Patient and family/care givers will demonstrate understanding of plan of care, disease process/condition, diagnostic tests and medications  Outcome: Progressing     Problem: Knowledge Deficit - Postpartum  Goal: Patient will verbalize and demonstrate understanding of self and infant care  Outcome: Progressing     Problem: Psychosocial - Postpartum  Goal: Patient will verbalize and demonstrate effective bonding and parenting behavior  Outcome: Progressing     Problem: Altered Physiologic Condition  Goal: Patient physiologically stable as evidenced by normal lochia, palpable uterine involution and vitals within normal limits  Outcome: Progressing     Problem: Infection - Postpartum  Goal: Postpartum patient will be free of signs and symptoms of infection  Outcome: Progressing     Problem: Respiratory/Oxygenation Function Post-Surgical  Goal: Patient will achieve/maintain normal respiratory rate/effort  Outcome: Progressing     Problem: Bowel Elimination - Post Surgical  Goal: Patient will resume regular bowel sounds and function with no discomfort or distention  Outcome: Progressing     Problem: Early Mobilization - Post Surgery  Goal: Early mobilization post surgery  Outcome: Progressing       Patient is not progressing towards the following goals:

## 2024-04-12 NOTE — LACTATION NOTE
This note was copied from a baby's chart.  Initial Consult:     History:  MOBKelle, is 22 y/o,  s/p  at 39+5 wks on 2024 at 1333. COVID (+). Fever during labor. Thin mec. H/o non-alcoholic fatty liver.     Baby girl had BW 3750 g (8 lbs, 3 oz). She had blow-by and deep suction at delivery.     History of BF: First time breastfeeding.     Report of Current Breastfeeding Status:     Kelle plans to breast/bottle feed. Exclusively breastfeeding at this time and feels breastfeeding is going well. Feeding q3h. Nipples mildly sore, but with good verbal understanding of elements of deep latch.     Baby girl swaddled x 2 with hat, asleep in crib at bedside, good color and tone.     Breastfeeding Assistance:      Provided breastfeeding education on: hunger cues, frequency/duration of breastfeeds, skin to skin, cluster feeding, shallow vs deep latch, and nutritive vs non-nutritive suck.    Reviewed signs of adequate milk intake, including adequate voids/stools, transition of stool to yellow/seedy around 5DOL, audible swallowing at breast, milk in mouth on release, progressive relaxation/satiation at breast during feeds, appropriate wt gain. Encouraged to keep peds appointments.    Discussed sore nipple care: Express colostrum to nipple and spread over nipple, allow to air dry. Follow with lanolin cream for dryness/crustiness.     Discussed optimal time to introduce bottle - 4 wks of life. Discussed how formula feeding can impact breastfeeding and milk supply.    [facility] Breastfeeding Resources handout provided and outpatient lactation care and support [place] options reviewed.     Provided [facility] handout per pt request.     Breastfeeding plan:    Feed baby with feeding cues and at least a minimum of 8x/24 hours.  Expect cluster feeding as this is normal during early days of life and growth spurts.  It is not recommended to let baby sleep longer than 4 hours between feedings and if sleepy, place skin to skin to  promote feeding interest and milk production.  Baby's usually feed more frequently and longer when skin to skin with mother. It is not recommended to use pacifiers or supplementing with formula until breast feeding and milk production is well established or at least 4 weeks.    Make sure infant is getting enough by ensuring frequent feedings as well as looking for transitioning stools from dark meconium to bright yellow/green seedy loose stools by day 5.     Follow up with PEDS PCP as scheduled for weight checks and to make sure feeding is progressing appropriately.    Call for breastfeeding for 1:1 lactation consultation through  [facility] (see information sheet) as needed and attend the [facility] without need for appointment.

## 2024-04-12 NOTE — DISCHARGE SUMMARY
Obstetrics Discharge Summary    Admission Date: 4/10/2024         Discharge Date: 2024    ADMISSION DIAGNOSIS:  1.  Term intrauterine pregnancy at 39+4 weeks.  2.  Active labor.  3.  COVID-positive.    DISCHARGE DIAGNOSIS:  1.  Term intrauterine pregnancy at 39+4 weeks.  2.  Active labor.  3.  COVID-positive.    DETAILS OF HOSPITAL STAY  Presenting Problem/History of Present Illness: Active labor.    Hospital Course:  The patient is a 23 y.o. , who presented to [facility] at 39w5d with a chief complaint of spontaneous rupture of membranes and contractions. She had prenatal care with [facility] with Dr. Harkins.  Her pregnancy was complicated by: N/A.  She was diagnosed with COVID during her admission.  For full details of the delivery please refer to the delivery note dictation. Briefly, the patient underwent an uncomplicated normal spontaneous vaginal delivery. There were no complications. Estimated blood loss was 300 ml. The patient delivered a viable female infant weighing 3750 g (8 pounds 3 ounces) with Apgars as below in delivery summary.  The patient’s recovery and postpartum course were unremarkable. By postpartum day #1 the patient met all appropriate milestones and was stable to be discharged to home buts he wanted to stay until ppd 2.     APGARs:   8   8      COMPLICATIONS: None.    PHYSICAL EXAM:  See todays note for exam     LABS/STUDIES:    Latest Reference Range & Units 04/10/24 21:28   WBC 4.8 - 10.8 K/uL 14.6 (H)   RBC 4.20 - 5.40 M/uL 4.19 (L)   Hemoglobin 12.0 - 16.0 g/dL 12.3   Hematocrit 37.0 - 47.0 % 36.3 (L)   MCV 81.4 - 97.8 fL 86.6   MCH 27.0 - 33.0 pg 29.4   MCHC 32.2 - 35.5 g/dL 33.9   RDW 35.9 - 50.0 fL 41.5   Platelet Count 164 - 446 K/uL 297   MPV 9.0 - 12.9 fL 9.0       DISPOSITION: Home.    DISCHARGE MEDICATIONS:  - Ibuprofen 800 mg every 8 hours as needed for pain.    DISCHARGE INSTRUCTIONS:  1. Pelvic rest for 6 weeks postpartum.   2. Postpartum  visit in 6 weeks at [facility] [phone].  3. Return to the emergency department if experiencing increased vaginal bleeding, severe pain, temperature greater than 100.4, or any other concerns.    DISCHARGE CONDITION: Stable.    Edyta Cheng M.D.

## 2024-04-12 NOTE — PROGRESS NOTES
0700- report received from NOC RN. POC discussed including feeding intervals, I+O documentation, latch support, lochia changes, ambulation, infant temperature management including STS and layering, weights, VS intervals, and discharge planning. Medications and other comfort measures discussed. Call light in reach. Infant brought to breast and latch achieved. Different hold positioning discussed and return demonstration provided. Pt reports concerns regarding supply. Hand expression performed with colostrum observed. No other needs at this time.

## 2024-04-13 VITALS
RESPIRATION RATE: 18 BRPM | BODY MASS INDEX: 34.75 KG/M2 | HEART RATE: 91 BPM | SYSTOLIC BLOOD PRESSURE: 106 MMHG | OXYGEN SATURATION: 97 % | TEMPERATURE: 97.6 F | DIASTOLIC BLOOD PRESSURE: 68 MMHG | HEIGHT: 60 IN | WEIGHT: 177 LBS

## 2024-04-13 PROBLEM — U07.1 COVID: Status: ACTIVE | Noted: 2024-04-13

## 2024-04-13 PROCEDURE — 700102 HCHG RX REV CODE 250 W/ 637 OVERRIDE(OP): Performed by: OBSTETRICS & GYNECOLOGY

## 2024-04-13 PROCEDURE — A9270 NON-COVERED ITEM OR SERVICE: HCPCS | Performed by: OBSTETRICS & GYNECOLOGY

## 2024-04-13 RX ORDER — IBUPROFEN 800 MG/1
800 TABLET ORAL EVERY 8 HOURS PRN
Qty: 30 TABLET | Refills: 0 | Status: SHIPPED | OUTPATIENT
Start: 2024-04-13

## 2024-04-13 RX ADMIN — IBUPROFEN 800 MG: 800 TABLET, FILM COATED ORAL at 04:16

## 2024-04-13 RX ADMIN — PRENATAL WITH FERROUS FUM AND FOLIC ACID 1 TABLET: 3080; 920; 120; 400; 22; 1.84; 3; 20; 10; 1; 12; 200; 27; 25; 2 TABLET ORAL at 09:16

## 2024-04-13 RX ADMIN — GUAIFENESIN 600 MG: 600 TABLET, EXTENDED RELEASE ORAL at 07:11

## 2024-04-13 ASSESSMENT — PAIN DESCRIPTION - PAIN TYPE
TYPE: ACUTE PAIN

## 2024-04-13 NOTE — PROGRESS NOTES
0700- report received from NOC RN. POC discussed including feeding intervals, I+O documentation, latch support, lochia changes, ambulation, infant temperature management including STS and layering, weights, VS intervals, and discharge planning. Medications and other comfort measures discussed. Call light in reach. Plan to discharge home today.

## 2024-04-13 NOTE — CARE PLAN
The patient is Stable - Low risk of patient condition declining or worsening    Shift Goals  Clinical Goals: VSS, fundus firm with light lochia, pain control  Patient Goals: pain WDL  Family Goals: bond    Progress made toward(s) clinical / shift goals:    Problem: Psychosocial - Postpartum  Goal: Patient will verbalize and demonstrate effective bonding and parenting behavior  Outcome: Progressing  Note: MOB bonding well with baby, providing all cares at this time, calls for assistance as needed.      Problem: Altered Physiologic Condition  Goal: Patient physiologically stable as evidenced by normal lochia, palpable uterine involution and vitals within normal limits  Outcome: Progressing  Note: Fundus firm with light lochia, patient voiding well.      Problem: Early Mobilization - Post Surgery  Goal: Early mobilization post surgery  Outcome: Progressing  Note: Ambulating well,  pain minimal       Patient is not progressing towards the following goals:

## 2024-04-13 NOTE — PROGRESS NOTES
1145- Discharge education provided and signed. All printed topics discussed. Emphasis provided on feeding plan, bleeding, and when to call doctor. follow up appointments discussed. All questions answered. No further needs at this time. Bands verified and cuddles removed from infant.     1205- Infant strapped into car seat by family and checked by RN. Escorted off unit. All belongings Present.

## 2024-04-13 NOTE — PROGRESS NOTES
Progress Note    Kelle Anaya PP day 2  Chief Admitting Dx: Indication for care or intervention in labor or delivery [O75.9]  Delivery Type: vaginal, spontaneous      Subjective:  Ambulating: voiding, tolerating diet, normal lochia, pain controlled.   Vitals:    24 1800 24 2200 24 0200 24 0601   BP: 113/80 107/68 107/77 102/66   Pulse: (!) 109 (!) 108 (!) 102 94   Resp: 18 19 17 18   Temp: 36.3 °C (97.3 °F) 37.4 °C (99.4 °F) 36.9 °C (98.5 °F) 36.4 °C (97.5 °F)   TempSrc: Temporal Temporal Temporal Temporal   SpO2: 98% 95% 96% 97%   Weight:       Height:           Exam:  Gen: no acute distress  abdomen: soft nt/nd firm fundus  Inc: c/d/i with postop dressing  Ext: no calf pain bilateral LE    Labs:   Recent Results (from the past 24 hour(s))   CBC without differential- Once in 24 hours post delivery    Collection Time: 24 12:59 PM   Result Value Ref Range    WBC 13.0 (H) 4.8 - 10.8 K/uL    RBC 3.51 (L) 4.20 - 5.40 M/uL    Hemoglobin 10.3 (L) 12.0 - 16.0 g/dL    Hematocrit 30.9 (L) 37.0 - 47.0 %    MCV 88.0 81.4 - 97.8 fL    MCH 29.3 27.0 - 33.0 pg    MCHC 33.3 32.2 - 35.5 g/dL    RDW 43.8 35.9 - 50.0 fL    Platelet Count 245 164 - 446 K/uL    MPV 9.2 9.0 - 12.9 fL       Assessment:  Ppd 2 s/p   Covid positive    Plan:  Dc home  Discharge home  Encourage ambulation  Pelvic rest, no heavy lifting/pulling /pushing  F/u in my office 6 weeks postpartum  Continue prenatal vitamins daily  Give Rhogam if Rh negative and indicated  Give MMR if indcated prior to discharge  Encourage breastfeeding      Edyta Cheng M.D.

## 2024-04-13 NOTE — DISCHARGE INSTRUCTIONS
